# Patient Record
Sex: MALE | Race: WHITE | NOT HISPANIC OR LATINO | Employment: OTHER | URBAN - METROPOLITAN AREA
[De-identification: names, ages, dates, MRNs, and addresses within clinical notes are randomized per-mention and may not be internally consistent; named-entity substitution may affect disease eponyms.]

---

## 2023-09-20 ENCOUNTER — HOSPITAL ENCOUNTER (EMERGENCY)
Facility: HOSPITAL | Age: 38
Discharge: HOME | End: 2023-09-20
Attending: STUDENT IN AN ORGANIZED HEALTH CARE EDUCATION/TRAINING PROGRAM
Payer: OTHER GOVERNMENT

## 2023-09-20 VITALS
RESPIRATION RATE: 19 BRPM | DIASTOLIC BLOOD PRESSURE: 79 MMHG | HEIGHT: 69 IN | HEART RATE: 85 BPM | BODY MASS INDEX: 26.66 KG/M2 | OXYGEN SATURATION: 100 % | WEIGHT: 180 LBS | TEMPERATURE: 98 F | SYSTOLIC BLOOD PRESSURE: 127 MMHG

## 2023-09-20 DIAGNOSIS — S01.81XA LACERATION OF FOREHEAD, INITIAL ENCOUNTER: Primary | ICD-10-CM

## 2023-09-20 PROCEDURE — 99282 EMERGENCY DEPT VISIT SF MDM: CPT

## 2023-09-20 PROCEDURE — 25000003 PHARM REV CODE 250: Performed by: STUDENT IN AN ORGANIZED HEALTH CARE EDUCATION/TRAINING PROGRAM

## 2023-09-20 PROCEDURE — 12013 RPR F/E/E/N/L/M 2.6-5.0 CM: CPT

## 2023-09-20 RX ORDER — LIDOCAINE HYDROCHLORIDE 10 MG/ML
10 INJECTION INFILTRATION; PERINEURAL
Status: COMPLETED | OUTPATIENT
Start: 2023-09-20 | End: 2023-09-20

## 2023-09-20 RX ADMIN — LIDOCAINE HYDROCHLORIDE 10 ML: 10 INJECTION, SOLUTION INFILTRATION; PERINEURAL at 01:09

## 2023-09-20 NOTE — ED PROVIDER NOTES
Encounter Date: 9/20/2023    SCRIBE #1 NOTE: I, Murphy Nelson, am scribing for, and in the presence of,  Star Duvall MD.       History     Chief Complaint   Patient presents with    Fall     Patient arrives via EMS after a fall. No LOC, positive for ETOH. Lacerations to forehead and chin, bleeding controlled. Patient not cooperative in triage.     Tad Bland is a 38 y.o. male with no pertinent PMHx, who presents to the ED via EMS for evaluation of a wound s/p fall today today. Patient reports he was at home when he fell and hit his forehead in the corner of a bathtub. He reports a wound to his forehead and a headache. He endorses EtOH usage tonight. Denies compliance with anticoagulants. No medications taken PTA. No alleviating or exacerbating factors noted. Denies visual disturbance, neck pain, back pain, or other associated symptoms. NKDA.    The history is provided by the patient. No  was used.     Review of patient's allergies indicates:  Not on File  No past medical history on file.  No past surgical history on file.  No family history on file.     Review of Systems   Constitutional:  Negative for chills and fever.   HENT:  Negative for facial swelling and sore throat.    Eyes:  Negative for visual disturbance.   Respiratory:  Negative for cough and shortness of breath.    Cardiovascular:  Negative for chest pain and palpitations.   Gastrointestinal:  Negative for abdominal pain, nausea and vomiting.   Genitourinary:  Negative for dysuria and hematuria.   Musculoskeletal:  Negative for back pain and neck pain.   Skin:  Positive for wound. Negative for rash.   Neurological:  Positive for headaches. Negative for weakness.   Hematological:  Does not bruise/bleed easily.   Psychiatric/Behavioral: Negative.         Physical Exam     Initial Vitals   BP Pulse Resp Temp SpO2   09/20/23 0135 09/20/23 0134 09/20/23 0124 09/20/23 0136 09/20/23 0134   119/74 87 18 97.9 °F (36.6 °C) 100 %       MAP       --                Physical Exam    Nursing note and vitals reviewed.  Constitutional: He appears well-developed and well-nourished. He is not diaphoretic. No distress.   Mildly intoxicated.   HENT:   Head: Normocephalic.   Right Ear: External ear normal.   Left Ear: External ear normal.   Nose: Nose normal.   3 cm linear horizontal laceration to the forehead. Bleeding controlled. No tenderness with palpation of the nasal bridge.  Mild left-sided periorbital ecchymosis.  No right-sided periorbital ecchymosis.  No melo sign noted bilaterally.  No hemotympanum bilaterally.  No cranial depressions palpated.   Eyes: Conjunctivae are normal. No scleral icterus.   Neck: Neck supple. No tracheal deviation present.   Cardiovascular:  Normal rate, regular rhythm and normal heart sounds.     Exam reveals no gallop and no friction rub.       No murmur heard.  Pulmonary/Chest: Breath sounds normal. No respiratory distress.   Abdominal: Abdomen is soft. Bowel sounds are normal. There is no abdominal tenderness.   Musculoskeletal:      Cervical back: Neck supple.      Comments: No midline spinal tenderness to the C/T/L vertebrae noted. No step-off or deformities noted.      Neurological: He is alert and oriented to person, place, and time. He has normal strength. No cranial nerve deficit or sensory deficit. GCS score is 15. GCS eye subscore is 4. GCS verbal subscore is 5. GCS motor subscore is 6.   Skin: Skin is warm and dry.   Psychiatric: He has a normal mood and affect. Thought content normal.         ED Course   Lac Repair    Date/Time: 9/20/2023 2:26 AM    Performed by: Star Duvall MD  Authorized by: Star Duvall MD    Consent:     Consent obtained:  Verbal    Consent given by:  Patient    Risks discussed:  Pain, infection, poor cosmetic result and poor wound healing  Universal protocol:     Patient identity confirmed:  Hospital-assigned identification number, verbally with patient and arm  band  Anesthesia:     Anesthesia method:  Local infiltration    Local anesthetic:  Lidocaine 1% w/o epi  Laceration details:     Location:  Face    Face location:  Forehead    Length (cm):  3    Depth (mm):  2  Exploration:     Hemostasis achieved with:  Direct pressure    Wound exploration: wound explored through full range of motion and entire depth of wound visualized      Wound extent: no foreign bodies/material noted, no underlying fracture noted and no vascular damage noted      Contaminated: no    Treatment:     Area cleansed with:  Saline    Amount of cleaning:  Standard    Irrigation method:  Syringe    Debridement:  None    Undermining:  None    Scar revision: no    Skin repair:     Repair method:  Sutures    Suture size:  5-0    Suture material:  Nylon    Suture technique:  Simple interrupted    Number of sutures:  7  Approximation:     Approximation:  Close  Repair type:     Repair type:  Simple  Post-procedure details:     Dressing:  Open (no dressing)    Procedure completion:  Tolerated    Labs Reviewed - No data to display       Imaging Results    None          Medications   LIDOcaine HCL 10 mg/ml (1%) injection 10 mL (10 mLs Infiltration Given by Provider 9/20/23 0145)     Medical Decision Making  Risk  Prescription drug management.            Scribe Attestation:   Scribe #1: I performed the above scribed service and the documentation accurately describes the services I performed. I attest to the accuracy of the note.        ED Course as of 09/20/23 0302   Wed Sep 20, 2023   0140 I discussed CT imaging of the patient's head and neck given his intoxication and fall.  Patient is refusing any CT imaging.  Patient is intoxicated but he is alert and oriented x4 and I believe is able to make this decision.  I do not believe we are in the right to force any imaging on the patient.  Plan to observe after I repair laceration to patient's forehead. [CC]   0224 Patient is up-to-date with tetanus. [CC]   0300  Patient ambulating with a normal gait.  Patient showing no other signs concerning for acute intracranial injury.  I discussed that I would like to observe patient for longer in the emergency department given that he will not let us evaluate his head injury further with imaging.  Patient is refusing to stay.  I believe patient is of sound mind to make this decision to leave.  Patient did not stay for discharge paperwork.  Patient discharged.    [CC]      ED Course User Index  [CC] Star Duvall MD                      IStar MD, personally performed the services described in this documentation. All medical record entries made by the scribe were at my direction and in my presence. I have reviewed the chart and agree that the record reflects my personal performance and is accurate and complete.    Clinical Impression:   Final diagnoses:  [S01.81XA] Laceration of forehead, initial encounter (Primary)        ED Disposition Condition    Discharge Stable          ED Prescriptions    None       Follow-up Information       Follow up With Specialties Details Why Contact Info    [facility] - [place] - Emergency Dept Emergency Medicine Go to  If symptoms worsen [address]  [facility] [id number] [phone]    Your PCP  Schedule an appointment as soon as possible for a visit in 1 week               Star Duvall MD  09/20/23 6415